# Patient Record
Sex: FEMALE | Race: WHITE | NOT HISPANIC OR LATINO | URBAN - METROPOLITAN AREA
[De-identification: names, ages, dates, MRNs, and addresses within clinical notes are randomized per-mention and may not be internally consistent; named-entity substitution may affect disease eponyms.]

---

## 2022-01-11 ENCOUNTER — APPOINTMENT (RX ONLY)
Dept: URBAN - METROPOLITAN AREA CLINIC 259 | Facility: CLINIC | Age: 37
Setting detail: DERMATOLOGY
End: 2022-01-11

## 2022-01-11 VITALS — HEIGHT: 61 IN | RESPIRATION RATE: 18 BRPM | WEIGHT: 142 LBS

## 2022-01-11 DIAGNOSIS — B36.0 PITYRIASIS VERSICOLOR: ICD-10-CM

## 2022-01-11 PROCEDURE — ? COUNSELING

## 2022-01-11 PROCEDURE — ? PRESCRIPTION

## 2022-01-11 PROCEDURE — 99204 OFFICE O/P NEW MOD 45 MIN: CPT

## 2022-01-11 PROCEDURE — ? MEDICATION COUNSELING

## 2022-01-11 PROCEDURE — ? ADDITIONAL NOTES

## 2022-01-11 RX ORDER — FLUCONAZOLE 150 MG/1
TABLET ORAL
Qty: 8 | Refills: 0 | Status: ERX | COMMUNITY
Start: 2022-01-11

## 2022-01-11 RX ORDER — KETOCONAZOLE 20 MG/ML
SHAMPOO, SUSPENSION TOPICAL QD
Qty: 120 | Refills: 2 | Status: ERX | COMMUNITY
Start: 2022-01-11

## 2022-01-11 RX ORDER — KETOCONAZOLE 20 MG/G
CREAM TOPICAL BID
Qty: 60 | Refills: 1 | Status: ERX | COMMUNITY
Start: 2022-01-11

## 2022-01-11 RX ADMIN — FLUCONAZOLE: 150 TABLET ORAL at 00:00

## 2022-01-11 RX ADMIN — KETOCONAZOLE: 20 CREAM TOPICAL at 00:00

## 2022-01-11 RX ADMIN — KETOCONAZOLE: 20 SHAMPOO, SUSPENSION TOPICAL at 00:00

## 2022-01-11 ASSESSMENT — LOCATION ZONE DERM: LOCATION ZONE: TRUNK

## 2022-01-11 ASSESSMENT — LOCATION DETAILED DESCRIPTION DERM: LOCATION DETAILED: LEFT SUPERIOR MEDIAL MIDBACK

## 2022-01-11 ASSESSMENT — LOCATION SIMPLE DESCRIPTION DERM: LOCATION SIMPLE: LEFT LOWER BACK

## 2022-01-11 NOTE — PROCEDURE: MEDICATION COUNSELING
Siliq Pregnancy And Lactation Text: The risk during pregnancy and breastfeeding is uncertain with this medication. - - -

## 2022-01-11 NOTE — PROCEDURE: MEDICATION COUNSELING
Lenard Valerio MD at 10/30/2016  3:45 PM      Status: Signed         Expand All Collapse All    Quick Note:      Dear Mr. Harvey,    Your recent blood tests are normal except for:  -Low normal ferritin (total body iron store) with recommended target of >50. Therefore I am suggesting you to start taking the following tablets/capsules for next 4 months:  Over-the-counter ferrous gluconate 324 mg (38 Fe) 1 tablet three times a day after food.  Over-the-counter vitamin C 500 mg with each iron tablet to increase iron absorption  Please note that stool can be black colored during iron therapy.  --Low vitamin B12. Advised to take over-the-counter vitamin B12 one tablet 2500 microgram  Daily.  -Positive antinuclear antibody (DEBBIE), non specific antibody  -Elevated IgA and low IgM of non-specific significance    * Please call Jesse Edgar PA-C's office to look for detailed evaluation of low ferritin level, as it may involve GI consultation for loss of blood leading to it.     Results for orders placed or performed in visit on 10/25/16  -Ferritin      Result                                            Value                         Ref Range                      Ferritin                                          9 (L)                         26 - 388 ng/mL             -Vitamin B12      Result                                            Value                         Ref Range                      Vitamin B12                                       254                           193 - 986 pg/mL            -Protein Immunofixation Serum      Result                                            Value                         Ref Range                      Immunofixation ELP                                                                                         No monoclonal protein seen on immunofixation.  Pathological significance requires clinical correlation. Aidan Stacy M.D., Ph.D.       IGG                                                1530                          695 - 1620 mg/dL               IGA                                               387 (H)                       70 - 380 mg/dL                 IGM                                               38 (L)                        60 - 265 mg/dL             -Antinuclear antibody screen by EIA      Result                                            Value                         Ref Range                      DEBBIE Screen by EIA                                 1.0 (H)                       <1.0                       -Methylmalonic acid      Result                                            Value                         Ref Range                      Methylmalonic Acid                                0.17                                                         Best wishes.    Thanks.    Sincerely,    Lenard Valerio MD, [facility]  Neurologist               Electromyography (EMG) test (Lower limbs))   DATE:  Thursday, January 12, 2016  TIME: Reporting by 12:40 PM for registration at  and test preparation.   LOCATION: [facility] (2nd floor)    You may bring pair of warm gloves and or socks to keep your hands and feet warm during the test.     No restrictions for diet. Please feel free to have breakfast/lunch before arrival.     Please do not apply lotion or cream to the skin one day before the test; as it could hinder the electrode connection to the skin.    EMG procedure and reasons for it explained.  -------------------------------------------------------------------------------------------------------------------  Feet Care.     Orders Placed This Encounter   Medications     gabapentin (NEURONTIN) 300 MG capsule     Sig: Take 1 capsule (300 mg) by mouth 3 times daily     Dispense:  270 capsule     Refill:  3     Advised him to start taking GABAPENTIN 100 mg strength, two tablets three times a day for one week, and after that GABAPENTIN strength 300 mg,  one tablet three times day. He has enough GABAPENTIN 100 mg strength from the previous prescription.     He is advised to see his Jesse Edgar PA-C regarding his low ferritin level, but in the meantime he will start taking iron with Vitamin C supplements as indicated above.      Diagnostic possibilities reviewed    Preventive Neurology: Encouraged to keep physically and mentally active with particular emphasis on daily stretching exercises, walking, and healthy eating.    Thanks      Protopic Counseling: Patient may experience a mild burning sensation during topical application. Protopic is not approved in children less than 2 years of age. There have been case reports of hematologic and skin malignancies in patients using topical calcineurin inhibitors although causality is questionable.

## 2022-01-11 NOTE — HPI: RASH
What Type Of Note Output Would You Prefer (Optional)?: Standard Output
Is The Patient Presenting As Previously Scheduled?: Yes
How Severe Is Your Rash?: mild
Is This A New Presentation, Or A Follow-Up?: Rash
Additional History: Pt states that the heat makes the rash come out, she did have tines versicolor on her back that she did not treat. She about two weeks ago she tried to put antifungal cream on her rash and it seemed to take it away until she got hot.

## 2022-01-11 NOTE — PROCEDURE: ADDITIONAL NOTES
Detail Level: Simple
Additional Notes: Patient consent was obtained to proceed with the visit and recommended plan of care after discussion of all risks and benefits, including the risks of COVID-19 exposure.
Render Risk Assessment In Note?: no
Additional Notes: Avoid alcohol with medication \\nPt to Ask pharmacist to cross check interactions w current medications

## 2022-01-11 NOTE — PROCEDURE: MEDICATION COUNSELING
Lymphadenopathy    Lymphadenopathy means that your lymph glands are swollen or larger than normal (enlarged). Lymph glands, also called lymph nodes, are collections of tissue that filter bacteria, viruses, and waste from your bloodstream. They are part of your body's disease-fighting system (immune system), which protects your body from germs.  There may be different causes of lymphadenopathy, depending on where it is in your body. Some types go away on their own. Lymphadenopathy can occur anywhere that you have lymph glands, including these areas:  · Neck (cervical lymphadenopathy).  · Chest (mediastinal lymphadenopathy).  · Lungs (hilar lymphadenopathy).  · Underarms (axillary lymphadenopathy).  · Groin (inguinal lymphadenopathy).  When your immune system responds to germs, infection-fighting cells and fluid build up in your lymph glands. This causes some swelling and enlargement. If the lymph glands do not go back to normal after you have an infection or disease, your health care provider may do tests. These tests help to monitor your condition and find the reason why the glands are still swollen and enlarged.  Follow these instructions at home:  · Get plenty of rest.  · Take over-the-counter and prescription medicines only as told by your health care provider. Your health care provider may recommend over-the-counter medicines for pain.  · If directed, apply heat to swollen lymph glands as often as told by your health care provider. Use the heat source that your health care provider recommends, such as a moist heat pack or a heating pad.  ? Place a towel between your skin and the heat source.  ? Leave the heat on for 20-30 minutes.  ? Remove the heat if your skin turns bright red. This is especially important if you are unable to feel pain, heat, or cold. You may have a greater risk of getting burned.  · Check your affected lymph glands every day for changes. Check other lymph gland areas as told by your health  care provider. Check for changes such as:  ? More swelling.  ? Sudden increase in size.  ? Redness or pain.  ? Hardness.  · Keep all follow-up visits as told by your health care provider. This is important.  Contact a health care provider if you have:  · Swelling that gets worse or spreads to other areas.  · Problems with breathing.  · Lymph glands that:  ? Are still swollen after 2 weeks.  ? Have suddenly gotten bigger.  ? Are red, painful, or hard.  · A fever or chills.  · Fatigue.  · A sore throat.  · Pain in your abdomen.  · Weight loss.  · Night sweats.  Get help right away if you have:  · Fluid leaking from an enlarged lymph gland.  · Severe pain.  · Chest pain.  · Shortness of breath.  Summary  · Lymphadenopathy means that your lymph glands are swollen or larger than normal (enlarged).  · Lymph glands (also called lymph nodes) are collections of tissue that filter bacteria, viruses, and waste from the bloodstream. They are part of your body's disease-fighting system (immune system).  · Lymphadenopathy can occur anywhere that you have lymph glands.  · If your enlarged and swollen lymph glands do not go back to normal after you have an infection or disease, your health care provider may do tests to monitor your condition and find the reason why the glands are still swollen and enlarged.  · Check your affected lymph glands every day for changes. Check other lymph gland areas as told by your health care provider.  This information is not intended to replace advice given to you by your health care provider. Make sure you discuss any questions you have with your health care provider.  Document Released: 09/26/2009 Document Revised: 11/02/2018 Document Reviewed: 11/02/2018  [employer] Patient Education © 2019 [employer]     Topical Retinoid Pregnancy And Lactation Text: This medication is Pregnancy Category C. It is unknown if this medication is excreted in breast milk.

## 2022-02-22 ENCOUNTER — APPOINTMENT (RX ONLY)
Dept: URBAN - METROPOLITAN AREA CLINIC 259 | Facility: CLINIC | Age: 37
Setting detail: DERMATOLOGY
End: 2022-02-22

## 2022-02-22 DIAGNOSIS — B36.0 PITYRIASIS VERSICOLOR: ICD-10-CM | Status: IMPROVED

## 2022-02-22 PROCEDURE — 99214 OFFICE O/P EST MOD 30 MIN: CPT

## 2022-02-22 PROCEDURE — ? PRESCRIPTION MEDICATION MANAGEMENT

## 2022-02-22 PROCEDURE — ? COUNSELING

## 2022-02-22 ASSESSMENT — LOCATION ZONE DERM: LOCATION ZONE: TRUNK

## 2022-02-22 ASSESSMENT — LOCATION SIMPLE DESCRIPTION DERM: LOCATION SIMPLE: LEFT LOWER BACK

## 2022-02-22 ASSESSMENT — LOCATION DETAILED DESCRIPTION DERM: LOCATION DETAILED: LEFT SUPERIOR MEDIAL MIDBACK

## 2022-02-22 NOTE — PROCEDURE: PRESCRIPTION MEDICATION MANAGEMENT
Render In Strict Bullet Format?: No
Continue Regimen: Ketoconazole shampoo to back QD
Plan: Discussed using shampoo longer term as maintenance
Detail Level: Zone
Discontinue Regimen: Fluconazole pills

## 2022-12-07 NOTE — PROCEDURE: MEDICATION COUNSELING
O-L Flap Text: The defect edges were debeveled with a #15 scalpel blade.  Given the location of the defect, shape of the defect and the proximity to free margins an O-L flap was deemed most appropriate.  Using a sterile surgical marker, an appropriate advancement flap was drawn incorporating the defect and placing the expected incisions within the relaxed skin tension lines where possible.    The area thus outlined was incised deep to adipose tissue with a #15 scalpel blade.  The skin margins were undermined to an appropriate distance in all directions utilizing iris scissors. Mirvaso Counseling: Mirvaso is a topical medication which can decrease superficial blood flow where applied. Side effects are uncommon and include stinging, redness and allergic reactions.

## 2025-02-27 ENCOUNTER — APPOINTMENT (OUTPATIENT)
Dept: URBAN - METROPOLITAN AREA CLINIC 259 | Facility: CLINIC | Age: 40
Setting detail: DERMATOLOGY
End: 2025-02-27

## 2025-02-27 DIAGNOSIS — I78.8 OTHER DISEASES OF CAPILLARIES: ICD-10-CM

## 2025-02-27 DIAGNOSIS — L81.4 OTHER MELANIN HYPERPIGMENTATION: ICD-10-CM

## 2025-02-27 DIAGNOSIS — D18.0 HEMANGIOMA: ICD-10-CM

## 2025-02-27 DIAGNOSIS — D22 MELANOCYTIC NEVI: ICD-10-CM

## 2025-02-27 DIAGNOSIS — L82.1 OTHER SEBORRHEIC KERATOSIS: ICD-10-CM

## 2025-02-27 PROBLEM — D22.5 MELANOCYTIC NEVI OF TRUNK: Status: ACTIVE | Noted: 2025-02-27

## 2025-02-27 PROBLEM — D23.5 OTHER BENIGN NEOPLASM OF SKIN OF TRUNK: Status: ACTIVE | Noted: 2025-02-27

## 2025-02-27 PROBLEM — D23.71 OTHER BENIGN NEOPLASM OF SKIN OF RIGHT LOWER LIMB, INCLUDING HIP: Status: ACTIVE | Noted: 2025-02-27

## 2025-02-27 PROBLEM — D18.01 HEMANGIOMA OF SKIN AND SUBCUTANEOUS TISSUE: Status: ACTIVE | Noted: 2025-02-27

## 2025-02-27 PROCEDURE — ? DEFER

## 2025-02-27 PROCEDURE — ? COUNSELING

## 2025-02-27 PROCEDURE — ? OBSERVATION

## 2025-02-27 PROCEDURE — ? TREATMENT REGIMEN

## 2025-02-27 PROCEDURE — 99203 OFFICE O/P NEW LOW 30 MIN: CPT

## 2025-02-27 ASSESSMENT — LOCATION DETAILED DESCRIPTION DERM
LOCATION DETAILED: LEFT BUTTOCK
LOCATION DETAILED: SUPERIOR THORACIC SPINE
LOCATION DETAILED: LEFT INFERIOR CENTRAL MALAR CHEEK
LOCATION DETAILED: LEFT MID-UPPER BACK
LOCATION DETAILED: RIGHT INFERIOR UPPER BACK
LOCATION DETAILED: LEFT INFERIOR MEDIAL UPPER BACK
LOCATION DETAILED: LEFT MEDIAL UPPER BACK

## 2025-02-27 ASSESSMENT — LOCATION SIMPLE DESCRIPTION DERM
LOCATION SIMPLE: RIGHT UPPER BACK
LOCATION SIMPLE: LEFT CHEEK
LOCATION SIMPLE: LEFT BUTTOCK
LOCATION SIMPLE: UPPER BACK
LOCATION SIMPLE: LEFT UPPER BACK

## 2025-02-27 ASSESSMENT — LOCATION ZONE DERM
LOCATION ZONE: FACE
LOCATION ZONE: TRUNK

## 2025-02-27 NOTE — PROCEDURE: DEFER
X Size Of Lesion In Cm (Optional): 0
Introduction Text (Please End With A Colon): Pt concerned with implants and will discuss with plastic surgeon Dr Nair if he can remove this spot during her next surgery with him \\nMonitor for changes and re eval if any changes noticed
Detail Level: Detailed

## 2025-02-27 NOTE — HPI: EVALUATION OF SKIN LESION(S)
What Type Of Note Output Would You Prefer (Optional)?: Standard Output
Hpi Title: Evaluation of Skin Lesions
How Severe Are Your Spot(S)?: mild
Have Your Spot(S) Been Treated In The Past?: has not been treated
Additional History: Spot on breast of concern.